# Patient Record
Sex: MALE | Race: WHITE | Employment: OTHER | URBAN - METROPOLITAN AREA
[De-identification: names, ages, dates, MRNs, and addresses within clinical notes are randomized per-mention and may not be internally consistent; named-entity substitution may affect disease eponyms.]

---

## 2017-05-23 ENCOUNTER — TELEPHONE (OUTPATIENT)
Dept: INTERNAL MEDICINE CLINIC | Age: 77
End: 2017-05-23

## 2017-05-23 NOTE — TELEPHONE ENCOUNTER
Patient's wife stopped by the office and wanted to know if Dr. Janine Cordero could review patient's chart for any xrays, CT scan of the patient chest. Patient wife stated that they are trying to make sure patient didn't die from asbestos. Please call Haja Ahn for more information.

## 2017-05-24 NOTE — TELEPHONE ENCOUNTER
I called Pts wife in regards to Xray and CT results. Informed wife that none of them showed asbestosis. Pts wife would like to know if any of the Xray in the past showed it. Dr Richar Nicholas please advise.